# Patient Record
Sex: FEMALE | Race: WHITE | NOT HISPANIC OR LATINO | Employment: UNEMPLOYED | URBAN - METROPOLITAN AREA
[De-identification: names, ages, dates, MRNs, and addresses within clinical notes are randomized per-mention and may not be internally consistent; named-entity substitution may affect disease eponyms.]

---
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
## 2021-05-17 LAB — POC BETA-HCG (QUAL): NEGATIVE

## 2021-05-27 LAB — POC BETA-HCG (QUAL): NEGATIVE

## 2021-08-26 LAB — POC BETA-HCG (QUAL): NEGATIVE

## 2022-04-10 ENCOUNTER — HISTORICAL (OUTPATIENT)
Dept: ADMINISTRATIVE | Facility: HOSPITAL | Age: 22
End: 2022-04-10

## 2022-04-29 VITALS
SYSTOLIC BLOOD PRESSURE: 118 MMHG | DIASTOLIC BLOOD PRESSURE: 72 MMHG | HEIGHT: 62 IN | WEIGHT: 229.25 LBS | BODY MASS INDEX: 42.19 KG/M2

## 2022-07-11 ENCOUNTER — HOSPITAL ENCOUNTER (EMERGENCY)
Facility: HOSPITAL | Age: 22
Discharge: HOME | End: 2022-07-11
Attending: INTERNAL MEDICINE
Payer: MEDICAID

## 2022-07-11 VITALS
BODY MASS INDEX: 38.07 KG/M2 | SYSTOLIC BLOOD PRESSURE: 134 MMHG | HEART RATE: 78 BPM | WEIGHT: 223 LBS | RESPIRATION RATE: 18 BRPM | HEIGHT: 64 IN | OXYGEN SATURATION: 98 % | TEMPERATURE: 99 F | DIASTOLIC BLOOD PRESSURE: 78 MMHG

## 2022-07-11 DIAGNOSIS — R11.2 NAUSEA AND VOMITING, INTRACTABILITY OF VOMITING NOT SPECIFIED, UNSPECIFIED VOMITING TYPE: ICD-10-CM

## 2022-07-11 DIAGNOSIS — U07.1 COVID-19: ICD-10-CM

## 2022-07-11 DIAGNOSIS — R51.9 NONINTRACTABLE HEADACHE, UNSPECIFIED CHRONICITY PATTERN, UNSPECIFIED HEADACHE TYPE: Primary | ICD-10-CM

## 2022-07-11 LAB
APPEARANCE UR: CLEAR
B-HCG SERPL QL: NEGATIVE
BACTERIA #/AREA URNS AUTO: ABNORMAL /HPF
BILIRUB UR QL STRIP.AUTO: NEGATIVE MG/DL
COLOR UR AUTO: YELLOW
GLUCOSE UR QL STRIP.AUTO: NEGATIVE MG/DL
KETONES UR QL STRIP.AUTO: 15 MG/DL
LEUKOCYTE ESTERASE UR QL STRIP.AUTO: ABNORMAL UNIT/L
MUCOUS THREADS URNS QL MICRO: ABNORMAL /LPF
NITRITE UR QL STRIP.AUTO: NEGATIVE
PH UR STRIP.AUTO: 6 [PH]
PROT UR QL STRIP.AUTO: 30 MG/DL
RBC #/AREA URNS AUTO: ABNORMAL /HPF
RBC UR QL AUTO: NEGATIVE UNIT/L
SP GR UR STRIP.AUTO: 1.02
SQUAMOUS #/AREA URNS AUTO: ABNORMAL /HPF
UROBILINOGEN UR STRIP-ACNC: 0.2 MG/DL
WBC #/AREA URNS AUTO: ABNORMAL /HPF

## 2022-07-11 PROCEDURE — 81025 URINE PREGNANCY TEST: CPT | Performed by: NURSE PRACTITIONER

## 2022-07-11 PROCEDURE — 81001 URINALYSIS AUTO W/SCOPE: CPT | Performed by: NURSE PRACTITIONER

## 2022-07-11 PROCEDURE — 25000003 PHARM REV CODE 250: Performed by: NURSE PRACTITIONER

## 2022-07-11 PROCEDURE — 99283 EMERGENCY DEPT VISIT LOW MDM: CPT

## 2022-07-11 RX ORDER — ONDANSETRON 4 MG/1
8 TABLET, ORALLY DISINTEGRATING ORAL 2 TIMES DAILY
Qty: 28 TABLET | Refills: 0 | Status: SHIPPED | OUTPATIENT
Start: 2022-07-11 | End: 2022-07-18

## 2022-07-11 RX ORDER — ONDANSETRON 4 MG/1
4 TABLET, ORALLY DISINTEGRATING ORAL
Status: COMPLETED | OUTPATIENT
Start: 2022-07-11 | End: 2022-07-11

## 2022-07-11 RX ADMIN — ONDANSETRON 4 MG: 4 TABLET, ORALLY DISINTEGRATING ORAL at 09:07

## 2022-07-11 NOTE — Clinical Note
"Ya Tirado" Chintan was seen and treated in our emergency department on 7/11/2022.  She may return to work on 07/15/2022.       If you have any questions or concerns, please don't hesitate to call.      NIDHI Car"

## 2022-07-12 NOTE — ED PROVIDER NOTES
"     Source of History:  Patient    Chief complaint:  Headache (Pt states tested COVID+ on Friday. States continued headache, vomiting, and dizziness. Last tylenol at 1900. )      HPI:  Ya Woodson is a 21 y.o. female presenting with headache, vomiting, dizziness.  She states she was diagnosed with COVID on Friday and has continued headaches.  Patient denies fever or chills. She reports nasal congestion and pressure but has not attempted anything for this pta. Denies syncope.     This is the extent to the patients complaints today here in the emergency department.    ROS: As per HPI and below:  General: No fever.  No chills.  Eyes: No visual changes.  ENT: No sore throat. No ear pain  Head: Headache.  Chest: No shortness of breath. No cough.  Cardiovascular: No chest pain.  Abdomen: No abdominal pain.  N/V.   Genito-Urinary: No abnormal urination.  Neurologic: No focal weakness.  No numbness. Dizziness.   MSK: No myalgias. No arthralgias.   Integument: No rashes or lesions.  Psych: No confusion      Review of patient's allergies indicates:  No Known Allergies    PMH:  As per HPI and below:  Past Medical History:   Diagnosis Date    Seizures      No past surgical history on file.    Social History     Tobacco Use    Smoking status: Never Smoker    Smokeless tobacco: Never Used   Substance Use Topics    Alcohol use: Never       Physical Exam:    BP (!) 144/96   Pulse 93   Temp 97.6 °F (36.4 °C)   Resp 18   Ht 5' 4" (1.626 m)   Wt 101.2 kg (223 lb)   SpO2 98%   BMI 38.28 kg/m²   Nursing note and vital signs reviewed.  Appearance: Afebrile. Not toxic appearing. No acute distress.  Head: Atraumatic  Eyes: No conjunctival injection. No scleral icterus  ENT: Normal phonation. Slightly bulding TM to the right. Sphenoid sinus pressure.   Chest/ Respiratory: No respiratory distress. No accessory muscle use.  Cardiovascular: Regular rate   Abdomen:  Not distended.    Musculoskeletal: Good range of motion all joints. "  No deformities.  Neck supple.  No meningismus.  Skin: No rashes seen.  Good turgor.  No ecchymoses.  Neurologic: GCS 15. Ambulates with a steady gait.   Mental Status:  Alert and oriented x 3.  Appropriate, conversant    Labs that have been ordered have been independently reviewed and interpreted by myself.    I decided to obtain the patient's medical records.  Summary of Medical Records:  Nursing documentation    Initial Impression/ Differential Dx:  Covid 19    MDM:    21 y.o. female with covid 19 diagnosed last Friday. The patient comes to the ER today for continued headaches. The patient is in no distress.  I discussed symptomatic treatment of her symptoms including Mucinex cold and flu fast max.  I discussed with her increasing her oral hydration for the next 24-48 hours.  We discussed Zofran prescription for home for the nausea vomiting.  Offered her steroids to which she refused.  Patient is aware also that axilla that is an option however this is mainly reserved for patients that have a high likelihood of deterioration and possible hospital admission which she is not fit the criteria.                   Diagnostic Impression:    1. Nonintractable headache, unspecified chronicity pattern, unspecified headache type    2. COVID-19    3. Nausea and vomiting, intractability of vomiting not specified, unspecified vomiting type         ED Disposition Condition    Discharge Stable          ED Prescriptions     Medication Sig Dispense Start Date End Date Auth. Provider    ondansetron (ZOFRAN-ODT) 4 MG TbDL Take 2 tablets (8 mg total) by mouth 2 (two) times daily. for 7 days 28 tablet 7/11/2022 7/18/2022 NIDHI Car        Follow-up Information    None          NIDHI Car  07/11/22 2059

## 2022-07-12 NOTE — DISCHARGE INSTRUCTIONS
Take medicines as prescribed    See your family doctor in one to 2 days for further evaluation, workup, and treatment as necessary    Avoid driving or operating machinery while taking medicines as some medicines might cause drowsiness and may cause problems. Also pain medicines have potential of being addictive  so use Pain meds specially Narcotics Sparingly.    The exam and treatment you received in Emergency Room was for an urgent problem and NOT INTENDED AS COMPLETE CARE. It is important that you FOLLOW UP with a doctor for ongoing care. If your symptoms become WORSE or you DO NOT IMPROVE and you are unable to reach your health care provider, you should RETURN to the emergency department. The Emergency Room doctor has provided a PRELIMINARY INTERPRETATION of all your STUDIES. A final interpretation may be done after you are discharged. IF A CHANGE in your diagnosis or treatment is needed WE WILL CONTACT YOU. It is critical that we have a CURRENT PHONE NUMBER FOR YOU.    Increase your hydration in the next 24-48 hours with water and electrolyte supplements including Gatorade, Pedialyte, Powerade.  Use Tylenol and/or Motrin for fever or body aches.  Use either DayQuil or NyQuil or Mucinex cold and flu fast max for your symptoms of nasal congestion and pressure which is causing your headaches.  Return emergency department any time if her condition changes or worsens.

## 2022-09-21 ENCOUNTER — HISTORICAL (OUTPATIENT)
Dept: ADMINISTRATIVE | Facility: HOSPITAL | Age: 22
End: 2022-09-21
Payer: MEDICAID

## 2023-05-05 DIAGNOSIS — G40.909 NONINTRACTABLE EPILEPSY WITHOUT STATUS EPILEPTICUS, UNSPECIFIED EPILEPSY TYPE: Primary | ICD-10-CM

## 2023-05-23 DIAGNOSIS — G40.909 EPILEPSY: Primary | ICD-10-CM

## 2023-05-25 NOTE — PROGRESS NOTES
[facility] Neurology Initial Office Visit Note    Initial Visit Date: 5/26/2023  Current Visit Date:  05/26/2023    Chief Complaint:     Chief Complaint   Patient presents with    Patient presents today with a hx of epilepsy     Patient states it has been almost a yr since she has had a grand mal seizure, she did mention that she has a petite seizure almost daily to where she stares off and her eyes twitch.       History of Present Illness:      This is 22 y.o. 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    Age of Onset :  Childhood    Semiology:   GTC  Behavioral arrest, photphosensative      Frequency: daily events/month, GTC frequency 1 events per year in the setting of COVID.     Provocation Factors: GTC in the setting of URI.     Risk Factors  - Family history of seizure disorders:  Yes father and paternal grandmother with seizure disorder.  - History of focal CNS lesions: No  - History of CNS infections: No  - History head trauma with prolonged LOC: No  - History of development delay: No   - History of underlying mood disorder: Yes anxiety, depression.    - History of sleep disorder: No  - Recreational drug use: No  - Alcohol use: No  - Family planning and contraceptive use: Yes on OCP.      Medications:     Current AEDs:  Lamotrigine 200 mg twice a day:  Failed weaning.  Fycompa 10 mg daily    Prior AEDs:  Levetiracetam: Ineffective   Oxcarbazepine: Ineffective   Lacosamide:  Ineffective  Topamax: Ineffective    Surgical Intervention & Devices:     - VNS:  - DBS  - RNS:  - Lobectomy:    Labs:     Results for orders placed or performed in visit on 09/21/22   POCT urine pregnancy   Result Value Ref Range    POC Beta-HCG (Qual) Negative        Studies:      - routine EEG 9/26/2017 at outside hospital:  As per documentation, polyspike with photo paroxysmal effect, probable primary generalized generalized.  - one hour EEG:   - 24 hour EEG:  - Ambulatory  EEG:  - EMU Video EEG:  - MRI Brain 10/20/2016:  As per documentation, normal.    - NCHCT:  - WADA:    Review of Systems:     Review of Systems   All other systems reviewed and are negative.    Physical Exams:     Vitals:    05/26/23 0842   BP: 126/88   Pulse: 88   Temp: 98.3 °F (36.8 °C)       Physical Exam  Vitals and nursing note reviewed.   Constitutional:       Appearance: Normal appearance.   HENT:      Head: Normocephalic and atraumatic.      Nose: Nose normal.      Mouth/Throat:      Mouth: Mucous membranes are moist.      Pharynx: Oropharynx is clear.   Eyes:      Conjunctiva/sclera: Conjunctivae normal.   Cardiovascular:      Rate and Rhythm: Normal rate and regular rhythm.      Pulses: Normal pulses.   Pulmonary:      Effort: Pulmonary effort is normal.      Breath sounds: Normal breath sounds.   Abdominal:      General: Abdomen is flat.   Musculoskeletal:         General: Normal range of motion.      Cervical back: Normal range of motion.   Skin:     General: Skin is warm.   Neurological:      Mental Status: She is alert.       Comprehensive Neurological Exam:  Mental Status: Alert Oriented to Self, Date, and Place. Comprehension wnl. No dysarthria.   CN II - XII: SHELLEY, No APD,  VFFC, No ptosis OU, EOMI without nystagmus, LT/Temp symmetric in CN V1-3 distribution, Hearing grossly intact, Face Symmetric, Tongue and Uvula midline, Trapezius symmetric bilateral.   Motor: tone and bulk wnl throughout, no abnormal involuntary or voluntary movements, 5/5 to confrontation, Fine finger movements wnl b/l, No pronator drift.   Sensory: LT, Proprioception, Vibration, PP, Temp symmetric.  Reflexes: 2+ throughout, plantar reflexes downward bilateral.   Cerebellar: FNF wnl b/l, RAHM wnl b/l,  Romberg: Negative  Gait: normal.     Assessment:     This is 22 y.o. 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 Patient is moving to [place] within next 3 months.  Patient opts to continue medication as it is for now until she transitions care to [place].     Problem List Items Addressed This Visit    None  Visit Diagnoses       Nonintractable epilepsy without status epilepticus, unspecified epilepsy type                Plan:     [] continue with Lamotrigine 200 mg twice a day  [] continue with Fycompa 10 mg once a day    RTC 6 Months      Seizure education provided: including family planning and teratogenicity of AEDs, risk of uncontrolled seizure disorder, SUDEP. No driving until seizure free for six months ([place] state law). No swimming, climbing heights, or operate heavy machinery without supervision. Patient is advised to avoid Benadryl, Tramadol, Wellbutrin, flashing lights, alcohol, sleep deprivation, and certain anti-biotics that can lower seizure threshold.     I have explained the treatment plan, diagnosis, and prognosis to patient. All questions are answered to the best of my knowledge.     Face to face time 45 minutes, including documentation, chart review, counseling, education, review of test results, relevant medical records, and coordination of care.   I have explained the treatment plan, diagnosis, and prognosis to patient. All questions are answered to the best of my knowledge.         Deborah Linares MD   General Neurology  05/26/2023

## 2023-05-26 ENCOUNTER — OFFICE VISIT (OUTPATIENT)
Dept: NEUROLOGY | Facility: CLINIC | Age: 23
End: 2023-05-26
Payer: MEDICAID

## 2023-05-26 VITALS
SYSTOLIC BLOOD PRESSURE: 126 MMHG | HEIGHT: 64 IN | OXYGEN SATURATION: 98 % | WEIGHT: 229.38 LBS | BODY MASS INDEX: 39.16 KG/M2 | TEMPERATURE: 98 F | DIASTOLIC BLOOD PRESSURE: 88 MMHG | HEART RATE: 88 BPM

## 2023-05-26 DIAGNOSIS — G40.319 INTRACTABLE GENERALIZED IDIOPATHIC EPILEPSY WITHOUT STATUS EPILEPTICUS: Primary | ICD-10-CM

## 2023-05-26 PROBLEM — G40.309 NONINTRACTABLE GENERALIZED IDIOPATHIC EPILEPSY WITHOUT STATUS EPILEPTICUS: Status: ACTIVE | Noted: 2023-05-26

## 2023-05-26 PROCEDURE — 3079F PR MOST RECENT DIASTOLIC BLOOD PRESSURE 80-89 MM HG: ICD-10-PCS | Mod: CPTII,,, | Performed by: PSYCHIATRY & NEUROLOGY

## 2023-05-26 PROCEDURE — 3079F DIAST BP 80-89 MM HG: CPT | Mod: CPTII,,, | Performed by: PSYCHIATRY & NEUROLOGY

## 2023-05-26 PROCEDURE — 1159F PR MEDICATION LIST DOCUMENTED IN MEDICAL RECORD: ICD-10-PCS | Mod: CPTII,,, | Performed by: PSYCHIATRY & NEUROLOGY

## 2023-05-26 PROCEDURE — 99204 PR OFFICE/OUTPT VISIT, NEW, LEVL IV, 45-59 MIN: ICD-10-PCS | Mod: S$PBB,,, | Performed by: PSYCHIATRY & NEUROLOGY

## 2023-05-26 PROCEDURE — 3008F PR BODY MASS INDEX (BMI) DOCUMENTED: ICD-10-PCS | Mod: CPTII,,, | Performed by: PSYCHIATRY & NEUROLOGY

## 2023-05-26 PROCEDURE — 3008F BODY MASS INDEX DOCD: CPT | Mod: CPTII,,, | Performed by: PSYCHIATRY & NEUROLOGY

## 2023-05-26 PROCEDURE — 99204 OFFICE O/P NEW MOD 45 MIN: CPT | Mod: S$PBB,,, | Performed by: PSYCHIATRY & NEUROLOGY

## 2023-05-26 PROCEDURE — 3074F SYST BP LT 130 MM HG: CPT | Mod: CPTII,,, | Performed by: PSYCHIATRY & NEUROLOGY

## 2023-05-26 PROCEDURE — 99213 OFFICE O/P EST LOW 20 MIN: CPT | Mod: PBBFAC | Performed by: PSYCHIATRY & NEUROLOGY

## 2023-05-26 PROCEDURE — 3074F PR MOST RECENT SYSTOLIC BLOOD PRESSURE < 130 MM HG: ICD-10-PCS | Mod: CPTII,,, | Performed by: PSYCHIATRY & NEUROLOGY

## 2023-05-26 PROCEDURE — 1159F MED LIST DOCD IN RCRD: CPT | Mod: CPTII,,, | Performed by: PSYCHIATRY & NEUROLOGY

## 2023-05-26 RX ORDER — LAMOTRIGINE 200 MG/1
200 TABLET ORAL 2 TIMES DAILY
Qty: 180 TABLET | Refills: 3 | Status: SHIPPED | OUTPATIENT
Start: 2023-05-26 | End: 2023-11-28 | Stop reason: SDUPTHER

## 2023-05-26 RX ORDER — PERAMPANEL 10 MG/1
10 TABLET ORAL NIGHTLY
Qty: 30 TABLET | Refills: 5 | Status: SHIPPED | OUTPATIENT
Start: 2023-05-26 | End: 2023-11-22

## 2023-05-26 RX ORDER — NORETHINDRONE ACETATE AND ETHINYL ESTRADIOL .02; 1 MG/1; MG/1
1 TABLET ORAL DAILY
COMMUNITY
End: 2023-07-20

## 2023-05-26 RX ORDER — LAMOTRIGINE 200 MG/1
200 TABLET ORAL 2 TIMES DAILY
COMMUNITY
Start: 2023-05-18 | End: 2023-05-26 | Stop reason: SDUPTHER

## 2023-05-26 RX ORDER — PERAMPANEL 10 MG/1
10 TABLET ORAL
COMMUNITY
Start: 2023-04-17 | End: 2023-05-26 | Stop reason: SDUPTHER

## 2023-06-01 ENCOUNTER — PROCEDURE VISIT (OUTPATIENT)
Dept: NEUROLOGY | Facility: HOSPITAL | Age: 23
End: 2023-06-01
Attending: PHYSICIAN ASSISTANT
Payer: MEDICAID

## 2023-06-01 DIAGNOSIS — G40.319 INTRACTABLE GENERALIZED IDIOPATHIC EPILEPSY WITHOUT STATUS EPILEPTICUS: Primary | Chronic | ICD-10-CM

## 2023-06-01 PROCEDURE — 95819 EEG AWAKE AND ASLEEP: CPT | Mod: 26,,, | Performed by: PSYCHIATRY & NEUROLOGY

## 2023-06-01 PROCEDURE — 95819 PR EEG,W/AWAKE & ASLEEP RECORD: ICD-10-PCS | Mod: 26,,, | Performed by: PSYCHIATRY & NEUROLOGY

## 2023-06-01 PROCEDURE — 95816 EEG AWAKE AND DROWSY: CPT

## 2023-06-01 NOTE — PROCEDURES
ROUTINE EEG    Date/Time: 6/1/2023 12:30 PM  Performed by: Deborah Linares MD  Authorized by: GRETA Bay     Indication:  Idiopathic primary generalized epilepsy, medically intractable.    Clinical Information: This is 22 y.o. 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     Semiology:   GTC  Behavioral arrest, photphosensative      Anticonvulsants: Lamotrigine 200 mg twice a day,  Fycompa 10 mg daily    Recording Condition: This is a routine electroencephalogram performed in outpatient settings using [employer] software. Electroencephalogram leads were placed using a 10-20 placement system. The electroencephalogram is monitored in real time by a technologist and is of good technical quality for review.     Technique: Electroencephalogram leads were placed using a 10-20 placement system and electrode impedance was maintained below 10KOhms. Omar and seizure detection computer program was used for digital EEG analysis throughout the monitoring period, to screen the EEG in real time and shelley the data file with pointers to electrographic seizure and interictal discharges. Hyperventilation was performed and Photic stimulation was performed.     Description:   Background Symmetry- Symmetric  Frequency - Beta, Alpha, and Theta  Voltage - Normal   Continuity - Continuous  Anterior to Posterior Gradient - Present  Posterior Dominant Rhythm - 9.5 Hz   Reactivity - present on both photic and hyperventilation.  Generalized 5 Hz polyspike and wave lasting up to 5 seconds without electroclinical activity was seen during hyperventilation, and photic stimulation at 12 hertz.   State Changes - Present with normal sleep stage N2 transients  Breach Artifact - Absent    Cyclic Alternating Pattern of Encephalopathy (CAPE) - Absent  Sporadic Epileptiform Discharges - Present:  Occasional generalized polyspike and wave  Rhythmic or Periodic Patterns (RPPs) -  Present:  generalized 3-4 hertz polyspike lasting from 1 second to 1.5 second, with bifrontal predominance, more frequent during drowsiness.    Conclusion: This is an abnormal routine awake and asleep EEG with generalized polyspike and wave activity.    Impression: This is an abnormal routine awake and asleep EEG suggestive of primary generalized epilepsy. There is however no ongoing electroclinical or electrographic goal seizure activity, nor is there any evidence of status epilepticus in this study. Clinical correlation is advised.

## 2023-06-19 DIAGNOSIS — N89.8 OTHER SPECIFIED NONINFLAMMATORY DISORDERS OF VAGINA: ICD-10-CM

## 2023-06-19 DIAGNOSIS — Z01.411 ENCOUNTER FOR GYNECOLOGICAL EXAMINATION (GENERAL) (ROUTINE) WITH ABNORMAL FINDINGS: ICD-10-CM

## 2023-06-19 RX ORDER — NORETHINDRONE ACETATE AND ETHINYL ESTRADIOL, AND FERROUS FUMARATE 1.5-30(21)
KIT ORAL
Qty: 28 TABLET | Refills: 11 | Status: SHIPPED | OUTPATIENT
Start: 2023-06-19 | End: 2023-07-20

## 2023-06-19 NOTE — PROGRESS NOTES
Chief Complaint:     Chief Complaint   Patient presents with    Vaginal Bleeding     Pt c/o irregular bleeding. States bleed most of the month.          HPI:   22 y.o. G0 presents with c/o irregular vaginal bleeding x 3 weeks.   Pt is currently taking Leostrin 1.5/30. Denies missed pills or antibiotics.  Has only been sexually active x 1, 5 years ago with female.      LMP: irreg  Frequency: irreg   Cycle Length: irreg   Flow: heavy  Intermenstrual Bleeding: Yes  Postcoital Bleeding: No  Dysmenorrhea: No  Sexually Active: no   Dyspareunia: No  Contraception: OCP   H/o STI: No   Last pap: No hx  H/o Abnormal Pap: No   Colposcopy: No  Gardasil: 3   MMG: n/a  H/o abn        Current Outpatient Medications:     FYCOMPA 10 mg tablet, Take 1 tablet (10 mg total) by mouth every evening., Disp: 30 tablet, Rfl: 5    JUNEL FE 1.5/30, 28, 1.5 mg-30 mcg (21)/75 mg (7) tablet, TAKE ONE TABLET BY MOUTH ONCE DAILY, Disp: 28 tablet, Rfl: 11    lamoTRIgine (LAMICTAL) 200 MG tablet, Take 1 tablet (200 mg total) by mouth 2 (two) times daily., Disp: 180 tablet, Rfl: 3    norethindrone-ethinyl estradiol (MICROGESTIN 1/20) 1-20 mg-mcg per tablet, Take 1 tablet by mouth once daily., Disp: , Rfl:     Review of patient's allergies indicates:  No Known Allergies    Social History     Tobacco Use    Smoking status: Never    Smokeless tobacco: Never   Substance Use Topics    Alcohol use: Not Currently    Drug use: Never       Review of Systems   Constitutional:  Negative for appetite change, chills, fatigue, fever and unexpected weight change.   Gastrointestinal:  Negative for abdominal pain, blood in stool, constipation, diarrhea, nausea, vomiting and reflux.   Genitourinary:  Positive for vaginal bleeding. Negative for bladder incontinence, decreased libido, dysmenorrhea, dyspareunia, dysuria, flank pain, frequency, genital sores, hematuria, hot flashes, menorrhagia, menstrual problem, pelvic pain, urgency, vaginal discharge, vaginal pain,  "urinary incontinence, postcoital bleeding, postmenopausal bleeding, vaginal dryness and vaginal odor.   Integumentary:  Negative for rash, acne, hair changes and mole/lesion.   Neurological:  Negative for headaches.        Physical Exam:   Vitals:    06/22/23 0814   BP: 126/80   BP Location: Right arm   Weight: 102.5 kg (226 lb)   Height: 5' 4" (1.626 m)       Body mass index is 38.79 kg/m².    Physical Exam  Constitutional:       Appearance: She is well-developed.   Abdominal:      General: Abdomen is flat. Bowel sounds are normal. There is no distension.      Palpations: Abdomen is soft. There is no mass.      Tenderness: There is no abdominal tenderness.      Hernia: No hernia is present.   Genitourinary:     Vagina: No vaginal discharge (bloody).   Neurological:      Mental Status: She is alert and oriented to person, place, and time.   Psychiatric:         Attention and Perception: Attention normal.         Mood and Affect: Mood normal.         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    Diagnoses and all orders for this visit:    Irregular bleeding  -     MDL Sendout Test    Breakthrough bleeding on OCPs    Myco/ureaplasma on one swab  - Explained common options for contraception including natural family planning, barrier methods, depo-provera, ocps,  patch, nuvaring, IUD, Nexplanon, and sterilization.     - Discussed that pills should be taken at the same time every day to minimize " breakthrough bleeding and to decrease failure rate.     - Advised patient that smoking is harmful due to increased risks of stroke, heart attack and blood clots when taking pills. Patient to contact us immediately if she experiences severe abdominal pain, severe chest pain, severe headaches, eye-visual changes, severe leg pain or SOB.     - Discussed that birth control, such as pills, Nuva Ring , Patch or Depo Provera, Nexplanon, IUDs do not protect against STDs.     Will continue Loestrin 1.5/30 since BTB only this pack and has tolerated pills in past with no BTB.  If continues, will make a change at annual exam

## 2023-06-22 ENCOUNTER — OFFICE VISIT (OUTPATIENT)
Dept: OBSTETRICS AND GYNECOLOGY | Facility: CLINIC | Age: 23
End: 2023-06-22
Payer: MEDICAID

## 2023-06-22 VITALS
SYSTOLIC BLOOD PRESSURE: 126 MMHG | HEIGHT: 64 IN | DIASTOLIC BLOOD PRESSURE: 80 MMHG | BODY MASS INDEX: 38.58 KG/M2 | WEIGHT: 226 LBS

## 2023-06-22 DIAGNOSIS — N92.6 IRREGULAR BLEEDING: Primary | ICD-10-CM

## 2023-06-22 DIAGNOSIS — N92.1 BREAKTHROUGH BLEEDING ON OCPS: ICD-10-CM

## 2023-06-22 PROCEDURE — 99213 PR OFFICE/OUTPT VISIT, EST, LEVL III, 20-29 MIN: ICD-10-PCS | Mod: ,,, | Performed by: NURSE PRACTITIONER

## 2023-06-22 PROCEDURE — 3008F PR BODY MASS INDEX (BMI) DOCUMENTED: ICD-10-PCS | Mod: CPTII,,, | Performed by: NURSE PRACTITIONER

## 2023-06-22 PROCEDURE — 3074F PR MOST RECENT SYSTOLIC BLOOD PRESSURE < 130 MM HG: ICD-10-PCS | Mod: CPTII,,, | Performed by: NURSE PRACTITIONER

## 2023-06-22 PROCEDURE — 1160F RVW MEDS BY RX/DR IN RCRD: CPT | Mod: CPTII,,, | Performed by: NURSE PRACTITIONER

## 2023-06-22 PROCEDURE — 3079F DIAST BP 80-89 MM HG: CPT | Mod: CPTII,,, | Performed by: NURSE PRACTITIONER

## 2023-06-22 PROCEDURE — 99213 OFFICE O/P EST LOW 20 MIN: CPT | Mod: ,,, | Performed by: NURSE PRACTITIONER

## 2023-06-22 PROCEDURE — 1159F PR MEDICATION LIST DOCUMENTED IN MEDICAL RECORD: ICD-10-PCS | Mod: CPTII,,, | Performed by: NURSE PRACTITIONER

## 2023-06-22 PROCEDURE — 3079F PR MOST RECENT DIASTOLIC BLOOD PRESSURE 80-89 MM HG: ICD-10-PCS | Mod: CPTII,,, | Performed by: NURSE PRACTITIONER

## 2023-06-22 PROCEDURE — 3074F SYST BP LT 130 MM HG: CPT | Mod: CPTII,,, | Performed by: NURSE PRACTITIONER

## 2023-06-22 PROCEDURE — 1159F MED LIST DOCD IN RCRD: CPT | Mod: CPTII,,, | Performed by: NURSE PRACTITIONER

## 2023-06-22 PROCEDURE — 1160F PR REVIEW ALL MEDS BY PRESCRIBER/CLIN PHARMACIST DOCUMENTED: ICD-10-PCS | Mod: CPTII,,, | Performed by: NURSE PRACTITIONER

## 2023-06-22 PROCEDURE — 3008F BODY MASS INDEX DOCD: CPT | Mod: CPTII,,, | Performed by: NURSE PRACTITIONER

## 2023-07-12 NOTE — PROGRESS NOTES
Chief Complaint: Annual exam    Chief Complaint   Patient presents with    Well Woman     Discuss restarting OCP        HPI:   22 y.o. WF  presents for an annual gyn exam.  Pt desires to discuss restarting OCPs.  Irregular vaginal bleeding has since stopped.        FmHx: negative for breast, uterine, ovarian, and colon cancers.     Labs / Significant Studies:  LMP: 23  Frequency: irreg   Cycle Length: irreg   Flow: heavy-light   Intermenstrual Bleeding: Yes  Postcoital Bleeding: No  Dysmenorrhea: No  Sexually Active: Not currently   Dyspareunia: No  Contraception: None  H/o STI: No   Last pap: No hx  H/o Abnormal Pap: No   Colposcopy: No  Gardasil: 3/  MMG: N/a  H/o Abnl MMG: N/a    Family History   Problem Relation Age of Onset    Myasthenia gravis Mother     Seizures Father     Fibromyalgia Paternal Grandmother     Seizures Paternal Grandmother          Past Medical History:   Diagnosis Date    Anemia     Anxiety     Clotting disorder     Depression     Hormone disorder     Seizures      Past Surgical History:   Procedure Laterality Date    wisdom teeth removal             Current Outpatient Medications:     FYCOMPA 10 mg tablet, Take 1 tablet (10 mg total) by mouth every evening., Disp: 30 tablet, Rfl: 5    lamoTRIgine (LAMICTAL) 200 MG tablet, Take 1 tablet (200 mg total) by mouth 2 (two) times daily., Disp: 180 tablet, Rfl: 3    JUNEL FE 1.5/30, 28, 1.5 mg-30 mcg (21)/75 mg (7) tablet, TAKE ONE TABLET BY MOUTH ONCE DAILY (Patient not taking: Reported on 2023), Disp: 28 tablet, Rfl: 11    norethindrone-ethinyl estradiol (MICROGESTIN ) 1-20 mg-mcg per tablet, Take 1 tablet by mouth once daily., Disp: , Rfl:     Review of patient's allergies indicates:  No Known Allergies    Social History     Tobacco Use    Smoking status: Never    Smokeless tobacco: Never   Substance Use Topics    Alcohol use: Not Currently    Drug use: Never       Review of Systems:  General/Constitutional: Chills  "denies. Fatigue/weakness denies. Fever denies. Night sweats denies. Hot flashes denies    Respiratory: Cough denies. Hemoptysis denies. SOB denies. Sputum production denies. Wheezing denies .   Cardiovascular: Chest pain denies . Dizziness denies. Palpitations denies. Swelling in hands/feet denies    Gastrointestinal: Abdominal pain denies. Blood in stool denies. Constipation denies. Diarrhea denies. Heartburn denies. Nausea denies. Vomiting denies    Genitourinary: Incontinence denies. Blood in urine denies. Frequent urination denies. Painful urination denies. Urinary urgency denies. Nocturia denies    Gynecologic: Irregular menses denies. Heavy bleeding denies. Painful menses denies. Vaginal discharge denies. Vaginal odor denies. Vaginal itching denies. Vaginal lesion denies. Pelvic pain denies. Decreased libido denies. Vulvar lesion denies. Prolapse of genital organs denies. Painful intercourse denies. Postcoital bleeding denies    Psychiatric: Depression denies. Anxiety denies     Physical Exam:   Vitals:    07/20/23 1041   BP: 124/78   Temp: 97.7 °F (36.5 °C)   Weight: 102.9 kg (226 lb 12.8 oz)   Height: 5' 4" (1.626 m)       Body mass index is 38.93 kg/m².       Chaperone: present.     General appearance: healthy, well-nourished and well-developed     Psychiatric: Orientation to time, place and person. Normal mood and affect and active, alert     Skin: Appearance: no rashes or lesions.     Neck:   Neck: supple, FROM, trachea midline. and no masses   Thyroid: no enlargement or nodules and non-tender.       Cardiovascular:   Auscultation: RRR and no murmur.   Peripheral Vascular: no varicosities, LLE edema, RLE edema, calf tenderness, and palpable cord and pedal pulses intact.     Lungs:   Respiratory effort: no intercostal retractions or accessory muscle usage.   Auscultation: no wheezing, rales/crackles, or rhonchi and clear to auscultation.     Breast:   Inspection/Palpation: no tenderness, discrete/distinct " masses, skin changes, or abnormal secretions. Nipple appearance normal.     Abdomen:   Auscultation/Inspection/Palpation: no hepatomegaly, splenomegaly, masses, tenderness or CVA tenderness and soft, non-distended bowel sounds preset.    Hernia: no palpable hernias.     Female Genitalia:    Vulva: no masses, tenderness or lesions    Bladder/Urethra: no urethral discharge or mass, normal meatus, bladder non-distended.    Vagina: no tenderness, erythema, cystocele, rectocele, abnormal vaginal discharge or vesicle(s) or ulcers    Cervix: no discharge, no cervical lacerations noted or motion tenderness and grossly normal    Uterus: normal size and shape and midline, non-tender, and no uterine prolapse.    Adnexa/Parametria: no parametrial tenderness or mass, no adnexal tenderness or ovarian mass.     Lymph Nodes:   Palpation: non tender submandibular nodes, axillary nodes, or inguinal nodes.     Rectal Exam:   Rectum: normal perianal skin.       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    Diagnoses and all orders for this visit:    Routine gynecological examination  PAP with cultures  Counseled regarding safe sex practices and prevention of STD's .  Discussed contraception options.  Advised avoidance of tobacco, alcohol, and drugs.  Discussed breast self-awareness  Seat belt  Multivitamin, Ca/Vit D  Healthy diet and exercise  RTC 1 yr   Encounter for contraceptive management, unspecified type  -     POCT  Urine Pregnancy  - Explained common options for contraception including natural family planning, barrier methods, depo-provera, ocps,  patch, nuvaring, IUD, Nexplanon, and sterilization.     - Discussed that pills should be taken at the same time every day to minimize breakthrough bleeding and to decrease failure rate.     - Advised patient that smoking is harmful due to increased risks of stroke, heart attack and blood clots when taking pills. Patient to contact us immediately if she experiences severe abdominal pain, severe chest pain, severe headaches, eye-visual changes, severe leg pain or SOB.     - Discussed that birth control, such as pills, Nuva Ring , Patch or Depo Provera, Nexplanon, IUDs do not protect against STDs.          Begin Lo-ovral Sunday  F/u 3 months  Cervical cancer screening  -     Liquid-Based Pap Smear, Screening Screening    Screening for STD (sexually transmitted disease)  -     Liquid-Based Pap Smear, Screening Screening

## 2023-07-20 ENCOUNTER — OFFICE VISIT (OUTPATIENT)
Dept: OBSTETRICS AND GYNECOLOGY | Facility: CLINIC | Age: 23
End: 2023-07-20
Payer: MEDICAID

## 2023-07-20 VITALS
TEMPERATURE: 98 F | WEIGHT: 226.81 LBS | BODY MASS INDEX: 38.72 KG/M2 | HEIGHT: 64 IN | SYSTOLIC BLOOD PRESSURE: 124 MMHG | DIASTOLIC BLOOD PRESSURE: 78 MMHG

## 2023-07-20 DIAGNOSIS — Z01.419 ROUTINE GYNECOLOGICAL EXAMINATION: Primary | ICD-10-CM

## 2023-07-20 DIAGNOSIS — Z30.9 ENCOUNTER FOR CONTRACEPTIVE MANAGEMENT, UNSPECIFIED TYPE: ICD-10-CM

## 2023-07-20 DIAGNOSIS — Z11.3 SCREENING FOR STD (SEXUALLY TRANSMITTED DISEASE): ICD-10-CM

## 2023-07-20 DIAGNOSIS — Z12.4 CERVICAL CANCER SCREENING: ICD-10-CM

## 2023-07-20 LAB
B-HCG UR QL: NEGATIVE
CTP QC/QA: YES

## 2023-07-20 PROCEDURE — 1160F PR REVIEW ALL MEDS BY PRESCRIBER/CLIN PHARMACIST DOCUMENTED: ICD-10-PCS | Mod: CPTII,,, | Performed by: NURSE PRACTITIONER

## 2023-07-20 PROCEDURE — 87491 CHLMYD TRACH DNA AMP PROBE: CPT | Performed by: NURSE PRACTITIONER

## 2023-07-20 PROCEDURE — 81025 POCT URINE PREGNANCY: ICD-10-PCS | Mod: ,,, | Performed by: NURSE PRACTITIONER

## 2023-07-20 PROCEDURE — 3078F DIAST BP <80 MM HG: CPT | Mod: CPTII,,, | Performed by: NURSE PRACTITIONER

## 2023-07-20 PROCEDURE — 81025 URINE PREGNANCY TEST: CPT | Mod: ,,, | Performed by: NURSE PRACTITIONER

## 2023-07-20 PROCEDURE — 3008F PR BODY MASS INDEX (BMI) DOCUMENTED: ICD-10-PCS | Mod: CPTII,,, | Performed by: NURSE PRACTITIONER

## 2023-07-20 PROCEDURE — 99395 PR PREVENTIVE VISIT,EST,18-39: ICD-10-PCS | Mod: ,,, | Performed by: NURSE PRACTITIONER

## 2023-07-20 PROCEDURE — 3078F PR MOST RECENT DIASTOLIC BLOOD PRESSURE < 80 MM HG: ICD-10-PCS | Mod: CPTII,,, | Performed by: NURSE PRACTITIONER

## 2023-07-20 PROCEDURE — 3074F SYST BP LT 130 MM HG: CPT | Mod: CPTII,,, | Performed by: NURSE PRACTITIONER

## 2023-07-20 PROCEDURE — 87591 N.GONORRHOEAE DNA AMP PROB: CPT

## 2023-07-20 PROCEDURE — 1160F RVW MEDS BY RX/DR IN RCRD: CPT | Mod: CPTII,,, | Performed by: NURSE PRACTITIONER

## 2023-07-20 PROCEDURE — 99395 PREV VISIT EST AGE 18-39: CPT | Mod: ,,, | Performed by: NURSE PRACTITIONER

## 2023-07-20 PROCEDURE — 1159F PR MEDICATION LIST DOCUMENTED IN MEDICAL RECORD: ICD-10-PCS | Mod: CPTII,,, | Performed by: NURSE PRACTITIONER

## 2023-07-20 PROCEDURE — 1159F MED LIST DOCD IN RCRD: CPT | Mod: CPTII,,, | Performed by: NURSE PRACTITIONER

## 2023-07-20 PROCEDURE — 87661 TRICHOMONAS VAGINALIS AMPLIF: CPT

## 2023-07-20 PROCEDURE — 3074F PR MOST RECENT SYSTOLIC BLOOD PRESSURE < 130 MM HG: ICD-10-PCS | Mod: CPTII,,, | Performed by: NURSE PRACTITIONER

## 2023-07-20 PROCEDURE — 3008F BODY MASS INDEX DOCD: CPT | Mod: CPTII,,, | Performed by: NURSE PRACTITIONER

## 2023-07-25 LAB — PSYCHE PATHOLOGY RESULT: NORMAL

## 2023-08-11 ENCOUNTER — TELEPHONE (OUTPATIENT)
Dept: OBSTETRICS AND GYNECOLOGY | Facility: CLINIC | Age: 23
End: 2023-08-11
Payer: MEDICAID

## 2023-08-11 NOTE — TELEPHONE ENCOUNTER
Patient called with complaints of rash over entire body.  She is concerned that this might be related to her birth control pills.  Started Lo Ovral birth control pills on July 20th.  Patient states rash began on approximately July 28th.  Denies itching.  Advised to stop pills, use condoms as backup, and monitor rash.  The patient is to restart birth control pills once rash completely resolves.  If rash returns will DC pills and re-evaluate.  Rash does not return then it was probably not related to birth control pills.  Advised patient if she has shortness of breath her other signs of a severe reaction she should go to hospital immediately.  Patient states understanding.

## 2023-11-22 NOTE — PROGRESS NOTES
[facility] Neurology Follow Up Office Visit Note    Initial Visit Date: 5/26/2023  Last Visit Date: 5/26/2023  Current Visit Date:  11/28/2023    Chief Complaint:     Chief Complaint   Patient presents with    Seizures     Has not had any grand mal seizures since last visit; Still with petite mal seizures daily       History of Present Illness:      This is 23 y.o. 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 No longer planning on moving to [place]. Family is moving to [place]. She is thinking of moving to [place] with grandmother.     Age of Onset :  Childhood     Semiology:   GTC  Behavioral arrest, photphosensative       Frequency: daily events/month, GTC frequency 1 events per year     Provocation Factors: GTC in the setting of URI.      Risk Factors  - Family history of seizure disorders:  Yes father and paternal grandmother with seizure disorder.  - History of focal CNS lesions: No  - History of CNS infections: No  - History head trauma with prolonged LOC: No  - History of development delay: No   - History of underlying mood disorder: Yes anxiety, depression.    - History of sleep disorder: No  - Recreational drug use: No  - Alcohol use: No  - Family planning and contraceptive use: Yes on OCP.         Medications:     Current Anti-Seizure Medication(s):  Lamotrigine 200 mg twice a day:  Failed weaning.  Fycompa 10 mg daily    Prior Anti-Seizure Medication(s):  Levetiracetam: Ineffective   Oxcarbazepine: Ineffective   Lacosamide: Ineffective  Topamax: Ineffective     Surgical Intervention & Devices:     - VNS:  - DBS  - RNS:  - Lobectomy:    Labs:     Results for orders placed or performed in visit on 07/20/23   POCT Urine Pregnancy   Result Value Ref Range    POC Preg Test, Ur Negative Negative     Acceptable Yes    Liquid-Based Pap Smear, Screening Screening   Result Value Ref Range    Pathology Result         Studies:      - routine EEG  6/1/2023: Generalized 5 Hz polyspike and wave lasting up to 5 seconds without electroclinical activity was seen during hyperventilation, and photic stimulation.  - one hour EEG:   - 24 hour EEG:  - Ambulatory EEG:  - EMU Video EEG:  - MRI Brain:   - NCHCT:  - WADA:    Review of Systems:     Review of Systems   All other systems reviewed and are negative.      Physical Exams:     Vitals:    11/28/23 0906   BP: 136/87   Pulse: 90   Resp: 12   Temp: 98.1 °F (36.7 °C)       Physical Exam  Vitals and nursing note reviewed.   Constitutional:       Appearance: Normal appearance.   HENT:      Head: Normocephalic and atraumatic.      Nose: Nose normal.      Mouth/Throat:      Mouth: Mucous membranes are moist.      Pharynx: Oropharynx is clear.   Eyes:      Conjunctiva/sclera: Conjunctivae normal.   Cardiovascular:      Rate and Rhythm: Normal rate and regular rhythm.      Pulses: Normal pulses.   Pulmonary:      Effort: Pulmonary effort is normal.      Breath sounds: Normal breath sounds.   Abdominal:      General: Abdomen is flat.   Musculoskeletal:         General: Normal range of motion.      Cervical back: Normal range of motion.   Skin:     General: Skin is warm.   Neurological:      Mental Status: She is alert.         Comprehensive Neurological Exam:  Mental Status: Alert Oriented to Self, Date, and Place. Comprehension wnl. No dysarthria.   CN II - XII: SHELLEY, No APD,  VFFC, No ptosis OU, EOMI without nystagmus, LT/Temp symmetric in CN V1-3 distribution, Hearing grossly intact, Face Symmetric, Tongue and Uvula midline, Trapezius symmetric bilateral.   Motor: tone and bulk wnl throughout, no abnormal involuntary or voluntary movements, 5/5 to confrontation, Fine finger movements wnl b/l, No pronator drift.   Sensory: LT, Proprioception, Vibration, PP, Temp symmetric.  Reflexes: 2+ throughout, plantar reflexes downward bilateral.   Cerebellar: FNF wnl b/l, RAHM wnl b/l,  Romberg: Negative  Gait: normal.         Assessment:     This is 23 y.o. 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 Now planning to move to [place].     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 No driving until seizure free for six months ([place] state law). No swimming, climbing heights, or operate heavy machinery without supervision. Patient is advised to avoid Benadryl, Tramadol, Wellbutrin, flashing lights, alcohol, sleep deprivation, and certain anti-biotics that can lower seizure threshold.     I have explained the treatment plan, diagnosis, and prognosis to patient. All questions are answered to the best of my knowledge.     Face to face time 30 minutes, including documentation, chart review, counseling, education, review of test results, relevant medical records, and coordination of care.   I have explained the treatment plan, diagnosis, and prognosis to patient. All questions are answered to the best of my knowledge.         Dbeorah Linares MD   General Neurology  11/28/2023       no

## 2023-11-28 ENCOUNTER — OFFICE VISIT (OUTPATIENT)
Dept: NEUROLOGY | Facility: CLINIC | Age: 23
End: 2023-11-28
Payer: MEDICAID

## 2023-11-28 VITALS
TEMPERATURE: 98 F | SYSTOLIC BLOOD PRESSURE: 136 MMHG | BODY MASS INDEX: 39.55 KG/M2 | HEIGHT: 64 IN | WEIGHT: 231.63 LBS | RESPIRATION RATE: 12 BRPM | OXYGEN SATURATION: 98 % | DIASTOLIC BLOOD PRESSURE: 87 MMHG | HEART RATE: 90 BPM

## 2023-11-28 DIAGNOSIS — G40.319 INTRACTABLE GENERALIZED IDIOPATHIC EPILEPSY WITHOUT STATUS EPILEPTICUS: Primary | ICD-10-CM

## 2023-11-28 PROCEDURE — 3008F BODY MASS INDEX DOCD: CPT | Mod: CPTII,,, | Performed by: PSYCHIATRY & NEUROLOGY

## 2023-11-28 PROCEDURE — 99213 OFFICE O/P EST LOW 20 MIN: CPT | Mod: PBBFAC | Performed by: PSYCHIATRY & NEUROLOGY

## 2023-11-28 PROCEDURE — 3079F PR MOST RECENT DIASTOLIC BLOOD PRESSURE 80-89 MM HG: ICD-10-PCS | Mod: CPTII,,, | Performed by: PSYCHIATRY & NEUROLOGY

## 2023-11-28 PROCEDURE — 3079F DIAST BP 80-89 MM HG: CPT | Mod: CPTII,,, | Performed by: PSYCHIATRY & NEUROLOGY

## 2023-11-28 PROCEDURE — 3008F PR BODY MASS INDEX (BMI) DOCUMENTED: ICD-10-PCS | Mod: CPTII,,, | Performed by: PSYCHIATRY & NEUROLOGY

## 2023-11-28 PROCEDURE — 99214 OFFICE O/P EST MOD 30 MIN: CPT | Mod: S$PBB,,, | Performed by: PSYCHIATRY & NEUROLOGY

## 2023-11-28 PROCEDURE — 3075F PR MOST RECENT SYSTOLIC BLOOD PRESS GE 130-139MM HG: ICD-10-PCS | Mod: CPTII,,, | Performed by: PSYCHIATRY & NEUROLOGY

## 2023-11-28 PROCEDURE — 1159F PR MEDICATION LIST DOCUMENTED IN MEDICAL RECORD: ICD-10-PCS | Mod: CPTII,,, | Performed by: PSYCHIATRY & NEUROLOGY

## 2023-11-28 PROCEDURE — 1159F MED LIST DOCD IN RCRD: CPT | Mod: CPTII,,, | Performed by: PSYCHIATRY & NEUROLOGY

## 2023-11-28 PROCEDURE — 3075F SYST BP GE 130 - 139MM HG: CPT | Mod: CPTII,,, | Performed by: PSYCHIATRY & NEUROLOGY

## 2023-11-28 PROCEDURE — 99214 PR OFFICE/OUTPT VISIT, EST, LEVL IV, 30-39 MIN: ICD-10-PCS | Mod: S$PBB,,, | Performed by: PSYCHIATRY & NEUROLOGY

## 2023-11-28 RX ORDER — LAMOTRIGINE 200 MG/1
200 TABLET ORAL 2 TIMES DAILY
Qty: 180 TABLET | Refills: 3 | Status: SHIPPED | OUTPATIENT
Start: 2023-11-28 | End: 2024-11-27